# Patient Record
Sex: MALE | Race: OTHER | URBAN - METROPOLITAN AREA
[De-identification: names, ages, dates, MRNs, and addresses within clinical notes are randomized per-mention and may not be internally consistent; named-entity substitution may affect disease eponyms.]

---

## 2019-11-15 ENCOUNTER — OFFICE VISIT (OUTPATIENT)
Dept: FAMILY MEDICINE CLINIC | Facility: CLINIC | Age: 34
End: 2019-11-15
Payer: COMMERCIAL

## 2019-11-15 VITALS
WEIGHT: 239 LBS | HEIGHT: 71 IN | TEMPERATURE: 98 F | RESPIRATION RATE: 18 BRPM | DIASTOLIC BLOOD PRESSURE: 80 MMHG | BODY MASS INDEX: 33.46 KG/M2 | HEART RATE: 71 BPM | OXYGEN SATURATION: 97 % | SYSTOLIC BLOOD PRESSURE: 100 MMHG

## 2019-11-15 DIAGNOSIS — K29.00 ACUTE GASTRITIS WITHOUT HEMORRHAGE, UNSPECIFIED GASTRITIS TYPE: Primary | ICD-10-CM

## 2019-11-15 PROCEDURE — 99213 OFFICE O/P EST LOW 20 MIN: CPT | Performed by: NURSE PRACTITIONER

## 2019-11-15 NOTE — PROGRESS NOTES
HPI:    Patient ID: Maryjane Trejo is a 37year old male. Patient presents to the clinic today with complaints of diarrhea for 4 days now. Patient is Upper sorbian speaking. Reports that other people in the household have been sick with diarrhea as well.   Gwendolyn Thomas This Visit:  Requested Prescriptions      No prescriptions requested or ordered in this encounter       Imaging & Referrals:  None       SOFIA#[id number]

## 2019-11-15 NOTE — PATIENT INSTRUCTIONS
24 hour liquid diet     Depues de las 24 horas puede empezar comida lijeras     amalia [place] para, physico         Dieta Blanda O Suave [[place] Diet]  [facility] dieta blanda se [place] para pacientes con malestar del [place].  Consiste en alimentos que son Payam Brown y fáci que tienen [place]  Especies  SÍ: La sal, flynn, jugo de flynn, el vinagre y [place], [place], paparica, toda especia y [address] las de [employer] se deben evitar  DIONNA: Efe, polvo de chili, los clavos, especias con semillas, [place], [address]

## 2019-11-23 ENCOUNTER — LAB ENCOUNTER (OUTPATIENT)
Dept: LAB | Age: 34
End: 2019-11-23
Attending: NURSE PRACTITIONER

## 2019-11-23 ENCOUNTER — OFFICE VISIT (OUTPATIENT)
Dept: FAMILY MEDICINE CLINIC | Facility: CLINIC | Age: 34
End: 2019-11-23
Payer: COMMERCIAL

## 2019-11-23 VITALS
HEIGHT: 69 IN | DIASTOLIC BLOOD PRESSURE: 64 MMHG | BODY MASS INDEX: 34.96 KG/M2 | TEMPERATURE: 98 F | SYSTOLIC BLOOD PRESSURE: 110 MMHG | WEIGHT: 236 LBS | OXYGEN SATURATION: 99 % | HEART RATE: 74 BPM

## 2019-11-23 DIAGNOSIS — Z00.00 HEALTH MAINTENANCE EXAMINATION: Primary | ICD-10-CM

## 2019-11-23 DIAGNOSIS — F17.200 SMOKER: ICD-10-CM

## 2019-11-23 DIAGNOSIS — Z00.00 HEALTH MAINTENANCE EXAMINATION: ICD-10-CM

## 2019-11-23 PROCEDURE — 36415 COLL VENOUS BLD VENIPUNCTURE: CPT | Performed by: NURSE PRACTITIONER

## 2019-11-23 PROCEDURE — 80061 LIPID PANEL: CPT | Performed by: NURSE PRACTITIONER

## 2019-11-23 PROCEDURE — 99395 PREV VISIT EST AGE 18-39: CPT | Performed by: NURSE PRACTITIONER

## 2019-11-23 PROCEDURE — 90686 IIV4 VACC NO PRSV 0.5 ML IM: CPT | Performed by: NURSE PRACTITIONER

## 2019-11-23 PROCEDURE — 90471 IMMUNIZATION ADMIN: CPT | Performed by: NURSE PRACTITIONER

## 2019-11-23 PROCEDURE — 80050 GENERAL HEALTH PANEL: CPT | Performed by: NURSE PRACTITIONER

## 2019-11-23 NOTE — PROGRESS NOTES
HPI:    Patient ID: Lisa Butler is a 37year old male. Patient presents to the clinic today for wellness exam, he is [place] speaking. Patient reports he overall feel like he is in good health. Has not had a physical exam in years.    The patient normal.   Nose: Nose normal.   Mouth/Throat: Uvula is midline, oropharynx is clear and moist and mucous membranes are normal.   Eyes: Pupils are equal, round, and reactive to light. Neck: Normal range of motion.    Cardiovascular: Normal rate, regular rhy

## 2019-11-23 NOTE — PROGRESS NOTES
Infulenza vaccine ordered by provider. Patient has never had a influenza vaccine before. Denies any previous vaccine allergies or reactions. Denies allergies to eggs. Denies any history of [place] syndrome.      Influenza Fluaval vaccine given l

## 2019-11-23 NOTE — PATIENT INSTRUCTIONS
WELLNESS EXAM WENT WELL, NO ACUTE FINDINGS       [place] alimentación piter    [place] alimentación piter puede reducir muchos de los riesgos a maldonado alyse. Puede ayudarle a bajar el nivel de colesterol, la presión arterial y [place].  Maldonado dieta no tiene por qué ser insí o más de las siguientes maneras:  · Por más tiempo. Ejercítese steve 30 minutos o más sin interrupciones. · Con mayor velocidad.  Pasee, corra o patine lo suficientemente rápido hayde para aumentar moderadamente maldonado ritmo cardíaco, hayde si hubiera caminado ayudará a perseverar? 1.   2.   3.   Date Last Reviewed: 3/1/2018  © 8873-4668 [employer] [address]. [address] [facility], [facility]. Todos los derechos reservados.  Esta información no pretende sustituir la [address] levantaré de la husain y caminaré por unos momentos. Claressa Sharp un lugar tranquilo y respiraré profundamente.    Me siento solo o aburrido Llamaré a un amigo para conversar   Consejos para lograr dejar de fumar  · Enumere los beneficios de dejar

## 2019-12-04 ENCOUNTER — TELEPHONE (OUTPATIENT)
Dept: FAMILY MEDICINE CLINIC | Facility: CLINIC | Age: 34
End: 2019-12-04

## 2019-12-04 NOTE — TELEPHONE ENCOUNTER
Would you be willing to call pt? I don't see that the result note was ever turned into a phone note. Pt requests someone Icelandic speaking.

## 2019-12-04 NOTE — TELEPHONE ENCOUNTER
Patient states when he had his px appt he also had labs done, states he hasn't received the results.  Patient prefers a call back in [facility]